# Patient Record
Sex: MALE | Race: WHITE | NOT HISPANIC OR LATINO | URBAN - NONMETROPOLITAN AREA
[De-identification: names, ages, dates, MRNs, and addresses within clinical notes are randomized per-mention and may not be internally consistent; named-entity substitution may affect disease eponyms.]

---

## 2017-11-08 NOTE — PROCEDURE NOTE: CLINICAL
PROCEDURE NOTE: Punctal Plugs, Extended #1 OD. Diagnosis: YVROSE. Prior to treatment, the risks/benefits/alternatives were discussed. The patient wished to proceed with procedure. Patient tolerated procedure well. There were no complications. Post procedure instructions given. Size *. Valerie Dent PROCEDURE NOTE: Punctal Plugs, Extended #1 OS. Diagnosis: YVROSE. Prior to treatment, the risks/benefits/alternatives were discussed. The patient wished to proceed with procedure. Patient tolerated procedure well. There were no complications. Post procedure instructions given. Size *. Valerie [employer]

## 2017-11-27 NOTE — PATIENT DISCUSSION
pt can have 2nd opinion with Dr. Ruben Recio or Dr. Sofie Ireland at [facility] for the lateral canthoplasty and  permanent tarsorraphy if pt wishes.

## 2017-12-06 NOTE — PATIENT DISCUSSION
Discussed with pt YVROSE is the most of the cause for decreased VA OU. MG can affect YVROSE also slowing blinking reflex.

## 2017-12-06 NOTE — PROCEDURE NOTE: CLINICAL
PROCEDURE NOTE: Eylea #5 OS. Diagnosis: Neovascular AMD with Active CNV. Prep: Betadine Drops and Betadine Scrub. Prior to injection, risks/benefits/alternatives discussed including corneal abrasion, infection, loss of vision, hemorrhage, cataract, glaucoma, retinal tears or detachment. A written consent is on file, and the need for today’s injection was discussed and the patient is understanding and wishes to proceed. The entire vial of Eylea was drawn up into a syringe. The opened vial, remaining drug, and filtered needle were disposed of in a certified biohazard container. Betadine prep was performed. Topical anesthesia was induced with Alcaine. 4% lidocaine pledge. A lid speculum was used. A short 30g needle on a 1cc syringe was used with product that that had previously been prepared under sterile conditions. Injection site: 3-4 mm from the limbus. The used syringe/needle was transferred to a biohazard container. Lid speculum removed. Mask worn during procedure. Patient tolerated procedure well. Count fingers vision was verified. There were no complications. Patient was given the standard instruction sheet. Patient given office phone number/answering service number and advised to call immediately should there be loss of vision or pain, or should they have any other questions or concerns. Tomasa Hardy

## 2017-12-06 NOTE — PATIENT DISCUSSION
Eylea injection recommended today after discussion of benefits, risks, alternatives, and off-label use. The injection was administered without complication. Post-injection instructions were reviewed and understood by the patient.

## 2018-01-11 NOTE — PATIENT DISCUSSION
Discussed the importance of blood sugar control in the prevention of ocular complications. ARSLAN ESPITIA.

## 2018-03-01 NOTE — PATIENT DISCUSSION
No fluid seen on OCT or exam today.  No treatment today, will monitor closely.  Advised the decrease in [place] is dues to cornea.

## 2018-03-12 NOTE — PROCEDURE NOTE: CLINICAL
PROCEDURE NOTE: Punctal Plugs, Silicone #2 OU. Diagnosis: YVROSE. Anesthesia: Topical. Prior to treatment, the risks/benefits/alternatives were discussed. The patient wished to proceed with procedure. Permanent silicone plugs were inserted. Size/location of plugs inserted: 0.8mm BLL. Patient tolerated procedure well. There were no complications. Post procedure instructions given. Flori Rojas

## 2018-04-19 NOTE — PROCEDURE NOTE: SURGICAL
Prior to commencing surgery patient identification, surgical procedure, site, and side were confirmed by Dr. Lakshmi Cabello. Following topical proparacaine anesthesia, the patient was positioned at the YAG laser, a contact lens coupled to the cornea with methylcellulose and an axial posterior capsulotomy performed without complication using 3.0 Mj x 25. Excess methylcellulose was washed from the eye, one drop of Alphagan was instilled and the patient returned to the holding area having tolerated the procedure well and without complication. <br />[id number]

## 2018-04-26 NOTE — PROCEDURE NOTE: SURGICAL
Prior to commencing surgery patient identification, surgical procedure, site, and side were confirmed by Dr. Luzmaria Baker. Following topical proparacaine anesthesia, the patient was positioned at the YAG laser, a contact lens coupled to the cornea with methylcellulose and an axial posterior capsulotomy performed without complication using 2.9 Mj x 26. Excess methylcellulose was washed from the eye, one drop of Alphagan was instilled and the patient returned to the holding area having tolerated the procedure well and without complication. <br />[id number]

## 2018-05-07 NOTE — PATIENT DISCUSSION
Worse today edema seen.  Rec Eylea # 6 today.  pt f/u with her retina specilaist up [facility] in 7 weeks.  Chronic condition, NO cure for AMD.  Goal of tx edu.

## 2018-05-07 NOTE — PROCEDURE NOTE: CLINICAL
PROCEDURE NOTE: Eylea #6 OS. Prior to injection, risks/benefits/alternatives discussed including corneal abrasion, infection, loss of vision, hemorrhage, cataract, glaucoma, retinal tears or detachment. A written consent is on file, and the need for today’s injection was discussed and the patient is understanding and wishes to proceed. The entire vial of Eylea was drawn up into a syringe. The opened vial, remaining drug, and filtered needle were disposed of in a certified biohazard container. Betadine prep was performed. Topical anesthesia was induced with Alcaine. 4% lidocaine pledge. A lid speculum was used. A short 31g needle on a 1cc syringe was used with product that that had previously been prepared under sterile conditions. Injection site: 3-4 mm from the limbus. The used syringe/needle was transferred to a biohazard container. Lid speculum removed. Mask worn during procedure. Patient tolerated procedure well. Count fingers vision was verified. There were no complications. Patient was given the standard instruction sheet. Patient given office phone number/answering service number and advised to call immediately should there be loss of vision or pain, or should they have any other questions or concerns. Radha Reaves

## 2019-01-14 NOTE — PATIENT DISCUSSION
Pt sts she needs more light to read.  Discussed the retina is losing cells due to the AMD so more light to read will be needed.

## 2019-03-04 NOTE — PROCEDURE NOTE: CLINICAL
PROCEDURE NOTE: Eylea 2mg #8 OS. Diagnosis: Neovascular AMD with Active CNV. Prior to injection, risks/benefits/alternatives discussed including corneal abrasion, infection, loss of vision, hemorrhage, cataract, glaucoma, retinal tears or detachment. A written consent is on file, and the need for today’s injection was discussed and the patient is understanding and wishes to proceed. The entire vial of Eylea was drawn up into a syringe. The opened vial, remaining drug, and filtered needle were disposed of in a certified biohazard container. Betadine prep was performed. Topical anesthesia was induced with Alcaine. 4% lidocaine pledge. A lid speculum was used. A short 30g needle on a 1cc syringe was used with product that that had previously been prepared under sterile conditions. Injection site: 3-4 mm from the limbus. The used syringe/needle was transferred to a biohazard container. Lid speculum removed. Mask worn during procedure. Patient tolerated procedure well. Count fingers vision was verified. There were no complications. Patient was given the standard instruction sheet. Patient given office phone number/answering service number and advised to call immediately should there be loss of vision or pain, or should they have any other questions or concerns. [facility]#[id number] exp 10/2019.

## 2019-03-04 NOTE — PATIENT DISCUSSION
Discussed the importance of blood sugar control in the prevention of ocular complications.  No DR/DME seen today.

## 2019-03-04 NOTE — PATIENT DISCUSSION
No fluid or edema seen today on OCT or exam.  High tendency for activity to return and pt holding well on Eylea, rec Eylea #8 today.  31G needle and pediatric spec used today with good rinse.

## 2019-04-29 NOTE — PROCEDURE NOTE: CLINICAL
PROCEDURE NOTE: Eylea 2mg #9 OS. Diagnosis: Neovascular AMD with Active CNV. Prior to injection, risks/benefits/alternatives discussed including corneal abrasion, infection, loss of vision, hemorrhage, cataract, glaucoma, retinal tears or detachment. A written consent is on file, and the need for today’s injection was discussed and the patient is understanding and wishes to proceed. The entire vial of Eylea was drawn up into a syringe. The opened vial, remaining drug, and filtered needle were disposed of in a certified biohazard container. Betadine prep was performed. Topical anesthesia was induced with Alcaine. 4% lidocaine pledge. A lid speculum was used. A short 30g needle on a 1cc syringe was used with product that that had previously been prepared under sterile conditions. Injection site: 3-4 mm from the limbus. The used syringe/needle was transferred to a biohazard container. Lid speculum removed. Mask worn during procedure. Patient tolerated procedure well. Count fingers vision was verified. There were no complications. Patient was given the standard instruction sheet. Patient given office phone number/answering service number and advised to call immediately should there be loss of vision or pain, or should they have any other questions or concerns. Tomasa Hardy

## 2019-04-29 NOTE — PATIENT DISCUSSION
No DR/DME seen on exam today. Discussed with the patient the importance of good control of their blood sugar, blood pressure, cholesterol, diet, exercise, weight, and medication usage under the guidance of their diabetic doctor to prevent/halt diabetic retinopathy.

## 2019-04-29 NOTE — PATIENT DISCUSSION
No fluid or edema seen today on OCT or exam.  High tendency for activity to return and pt holding well on Eylea, rec Eylea #9 today.  31G needle and pediatric spec used today with good rinse. Pt leaving to go back [facility]. Recommend pt follow back up with Dr. Kesha Whyte in 6 weeks.

## 2019-11-04 NOTE — PATIENT DISCUSSION
Pt stated double vision usually happens towards the end of the day.  Recommended holding cold packs over eyes at night when double vision starts.  If the double vision goes away then it is related to Myasthenia Gravis. If cold packs do not help then schedule exam with [facility].

## 2019-11-04 NOTE — PATIENT DISCUSSION
Pt edu no fluid seen today.  Pt stated Dr. Janna Marquez monitored monthly and only had 1 injection due to traveling to [place].  Recommended treatments every 12 weeks.  Will monitor every 2 months while in [place].

## 2020-01-06 NOTE — PATIENT DISCUSSION
Pt edu no fluid seen today.  Treatment not indicated at this time. Will only treat if worsens. S/S of worsening discussed in detail. Advised to call with any vision changes.

## 2020-03-02 NOTE — PATIENT DISCUSSION
Pt edu no fluid seen today.  Treatment not indicated at this time. Will only treat if worsens. S/S of worsening discussed in detail. Advised to call with any vision changes. Will follow up before pt leaves to go [place].

## 2020-05-04 NOTE — PATIENT DISCUSSION
Pt edu no fluid seen today.  Treatment not indicated at this time. Will only treat if worsens. S/S of worsening discussed in detail. Advised to call with any vision changes. Recommend pt follow up with Dr. Shaylee Bearden in 2 months.

## 2020-11-16 NOTE — PATIENT DISCUSSION
Mild worsening today, + trace edema.  Pt has responded very well to prior Eylea and has not had a treatment for over 1 year.  Rec Eylea #11 today then will re-eval in 2 months for possible additional tx.

## 2020-11-16 NOTE — PROCEDURE NOTE: CLINICAL
PROCEDURE NOTE: Eylea 2mg #11 OS. Diagnosis: Neovascular AMD with Active CNV. Prep: Betadine Drops and Betadine Scrub. Prior to injection, risks/benefits/alternatives discussed including corneal abrasion, infection, loss of vision, hemorrhage, cataract, glaucoma, retinal tears or detachment. A written consent is on file, and the need for today’s injection was discussed and the patient is understanding and wishes to proceed. The entire vial of Eylea was drawn up into a syringe. The opened vial, remaining drug, and filtered needle were disposed of in a certified biohazard container. Betadine prep was performed. Topical anesthesia was induced with Alcaine. 4% lidocaine pledge. A lid speculum was used. A short 30g needle on a 1cc syringe was used with product that that had previously been prepared under sterile conditions. Injection site: 3-4 mm from the limbus. The used syringe/needle was transferred to a biohazard container. Lid speculum removed. Mask worn during procedure. Patient tolerated procedure well. Count fingers vision was verified. There were no complications. Patient was given the standard instruction sheet. Patient given office phone number/answering service number and advised to call immediately should there be loss of vision or pain, or should they have any other questions or concerns. Fercho Morton

## 2021-01-18 NOTE — PATIENT DISCUSSION
Improved, no edema today. Pt has responded very well with minimal medication. Treatment not indicated at this time. Will continue to monitor and treat only if worsens. Advised pt to call with any vision changes.

## 2021-03-15 NOTE — PROCEDURE NOTE: CLINICAL
PROCEDURE NOTE: Eylea 2mg(1 of 2) #12 OS. Diagnosis: Neovascular AMD with Active CNV. Prep: Betadine Drops and Betadine Scrub. Prior to injection, risks/benefits/alternatives discussed including corneal abrasion, infection, loss of vision, hemorrhage, cataract, glaucoma, retinal tears or detachment. A written consent is on file, and the need for today’s injection was discussed and the patient is understanding and wishes to proceed. The entire vial of Eylea was drawn up into a syringe. The opened vial, remaining drug, and filtered needle were disposed of in a certified biohazard container. Betadine prep was performed. Topical anesthesia was induced with Alcaine. 4% lidocaine pledge. A lid speculum was used. A short 30g needle on a 1cc syringe was used with product that that had previously been prepared under sterile conditions. Injection site: 3-4 mm from the limbus. The used syringe/needle was transferred to a biohazard container. Lid speculum removed. Mask worn during procedure. Patient tolerated procedure well. Count fingers vision was verified. There were no complications. Patient was given the standard instruction sheet. Patient given office phone number/answering service number and advised to call immediately should there be loss of vision or pain, or should they have any other questions or concerns. Christele Hand PROCEDURE NOTE: Epilation #1 OS. Diagnosis: Trichiasis without Entropion. Anesthesia: Topical. Aberrant lashes removed from LL lid(s) using microforcep. Patient tolerated procedure well. There were no complications. Post-op instructions given. Bi Miller

## 2021-06-21 NOTE — PATIENT DISCUSSION
Discussed with patient and  that treatment may be needed with every visit. Edu this is a chronic condition with no cure.

## 2021-06-21 NOTE — PATIENT DISCUSSION
Eylea #13 (2 of 2) injection recommended today after discussion of benefits, risks, alternatives. The injection was administered without complication. Post-injection instructions were reviewed and understood by the patient.

## 2021-08-02 NOTE — PATIENT DISCUSSION
Eylea #14 (1 of 2) injection recommended today after discussion of benefits, risks, alternatives. The injection was administered without complication. Post-injection instructions were reviewed and understood by the patient.  series of 2 x 6-7 weeks apart.

## 2021-08-02 NOTE — PROCEDURE NOTE: CLINICAL
PROCEDURE NOTE: Eylea 2mg ( 1 of 2) #14 OS. Diagnosis: Neovascular AMD with Active CNV. Prep: Betadine Drops and Betadine Scrub. Prior to injection, risks/benefits/alternatives discussed including corneal abrasion, infection, loss of vision, hemorrhage, cataract, glaucoma, retinal tears or detachment. A written consent is on file, and the need for today’s injection was discussed and the patient is understanding and wishes to proceed. The entire vial of Eylea was drawn up into a syringe. The opened vial, remaining drug, and filtered needle were disposed of in a certified biohazard container. Betadine prep was performed. Topical anesthesia was induced with Alcaine. 4% lidocaine pledge. A lid speculum was used. A short 30g needle on a 1cc syringe was used with product that that had previously been prepared under sterile conditions. Injection site: 3-4 mm from the limbus. The used syringe/needle was transferred to a biohazard container. Lid speculum removed. Mask worn during procedure. Patient tolerated procedure well. Count fingers vision was verified. There were no complications. Patient was given the standard instruction sheet. Patient given office phone number/answering service number and advised to call immediately should there be loss of vision or pain, or should they have any other questions or concerns. Fercho Morton

## 2021-12-10 ENCOUNTER — IMPORTED ENCOUNTER (OUTPATIENT)
Dept: URBAN - NONMETROPOLITAN AREA CLINIC 1 | Facility: CLINIC | Age: 41
End: 2021-12-10

## 2021-12-10 PROBLEM — H52.223: Noted: 2021-12-10

## 2021-12-10 PROBLEM — H52.13: Noted: 2021-12-10

## 2021-12-10 PROCEDURE — S0620 ROUTINE OPHTHALMOLOGICAL EXA: HCPCS

## 2022-04-09 ASSESSMENT — VISUAL ACUITY
OD_SC: 20/20
OS_SC: 20/25+

## 2022-04-09 ASSESSMENT — TONOMETRY
OD_IOP_MMHG: 13
OS_IOP_MMHG: 15

## 2022-08-15 NOTE — PATIENT DISCUSSION
Follow with WTIENL. on the discharge service for the patient. I have reviewed and made amendments to the documentation where necessary.